# Patient Record
Sex: MALE | Race: WHITE | NOT HISPANIC OR LATINO
[De-identification: names, ages, dates, MRNs, and addresses within clinical notes are randomized per-mention and may not be internally consistent; named-entity substitution may affect disease eponyms.]

---

## 2018-11-01 ENCOUNTER — RECORD ABSTRACTING (OUTPATIENT)
Age: 83
End: 2018-11-01

## 2018-11-01 DIAGNOSIS — Z87.09 PERSONAL HISTORY OF OTHER DISEASES OF THE RESPIRATORY SYSTEM: ICD-10-CM

## 2018-11-01 DIAGNOSIS — Z78.9 OTHER SPECIFIED HEALTH STATUS: ICD-10-CM

## 2018-11-01 RX ORDER — ASPIRIN 81 MG
81 TABLET, DELAYED RELEASE (ENTERIC COATED) ORAL DAILY
Refills: 0 | Status: ACTIVE | COMMUNITY

## 2018-11-28 ENCOUNTER — APPOINTMENT (OUTPATIENT)
Dept: CARDIOLOGY | Facility: CLINIC | Age: 83
End: 2018-11-28
Payer: MEDICARE

## 2018-11-28 VITALS
WEIGHT: 187 LBS | SYSTOLIC BLOOD PRESSURE: 130 MMHG | DIASTOLIC BLOOD PRESSURE: 60 MMHG | HEART RATE: 72 BPM | BODY MASS INDEX: 24.78 KG/M2 | HEIGHT: 73 IN

## 2018-11-28 DIAGNOSIS — Z82.49 FAMILY HISTORY OF ISCHEMIC HEART DISEASE AND OTHER DISEASES OF THE CIRCULATORY SYSTEM: ICD-10-CM

## 2018-11-28 PROCEDURE — 99214 OFFICE O/P EST MOD 30 MIN: CPT

## 2018-11-28 PROCEDURE — 93000 ELECTROCARDIOGRAM COMPLETE: CPT

## 2018-11-28 RX ORDER — LEVOFLOXACIN 500 MG/1
500 TABLET, FILM COATED ORAL DAILY
Refills: 0 | Status: DISCONTINUED
End: 2018-11-28

## 2018-11-28 RX ORDER — OSELTAMIVIR PHOSPHATE 75 MG/1
75 CAPSULE ORAL TWICE DAILY
Refills: 0 | Status: DISCONTINUED | COMMUNITY
End: 2018-11-28

## 2018-11-28 RX ORDER — DOXAZOSIN 2 MG/1
2 TABLET ORAL DAILY
Refills: 0 | Status: DISCONTINUED | COMMUNITY
End: 2018-11-28

## 2018-11-28 RX ORDER — ROSUVASTATIN CALCIUM 5 MG/1
5 TABLET, FILM COATED ORAL DAILY
Refills: 0 | Status: DISCONTINUED | COMMUNITY
End: 2018-06-28

## 2018-11-28 RX ORDER — LEVOFLOXACIN 500 MG/1
500 TABLET, FILM COATED ORAL DAILY
Refills: 0 | Status: DISCONTINUED | COMMUNITY
End: 2018-11-28

## 2018-11-28 NOTE — ASSESSMENT
[FreeTextEntry1] : This report was generated using Dragon Dictation. Please excuse obvious typographical errors and contact this office for any questions. Any preliminary copy of this note given to the patient at the time of this visit has not been proofread or edited. This note is a part of a shared electronic record used by our physicians and may contain information generated by other physicians in this practice in addition to your visit with this office.\par

## 2018-11-28 NOTE — REASON FOR VISIT
[FreeTextEntry1] : This 83-year-old male patient presents for evaluation of coronary atherosclerosis based on the coronary calcification scan, cardiac arrhythmias, abnormal EKG, minor valve abnormalities, cardiovascular risk factors.\par \par He has additional medical problems as noted.\par \par

## 2018-11-28 NOTE — HISTORY OF PRESENT ILLNESS
[FreeTextEntry1] : Since his last examination in the office one year ago the patient has had some medical interactions.\par \par The patient has had some orthopedic issues involving his left knee. He has had injection therapy. He is exercising. He feels it is getting a bit better.\par \par There have been no acute symptoms of shortness of breath, chest discomfort, palpitation, lightheadedness. Overall he feels well.

## 2018-11-28 NOTE — REVIEW OF SYSTEMS
[see HPI] : see HPI [Negative] : Psychiatric [Joint Pain] : joint pain [Cough] : no cough [Wheezing] : no wheezing [Muscle Cramps] : no muscle cramps [Skin: A Rash] : no rash: [Easy Bleeding] : no tendency for easy bleeding [Easy Bruising] : no tendency for easy bruising

## 2019-05-16 ENCOUNTER — APPOINTMENT (OUTPATIENT)
Dept: FAMILY MEDICINE | Facility: CLINIC | Age: 84
End: 2019-05-16
Payer: MEDICARE

## 2019-05-16 VITALS — OXYGEN SATURATION: 98 % | HEART RATE: 58 BPM

## 2019-05-16 VITALS
HEIGHT: 73 IN | DIASTOLIC BLOOD PRESSURE: 70 MMHG | SYSTOLIC BLOOD PRESSURE: 130 MMHG | WEIGHT: 186 LBS | BODY MASS INDEX: 24.65 KG/M2

## 2019-05-16 PROCEDURE — 36415 COLL VENOUS BLD VENIPUNCTURE: CPT

## 2019-05-16 PROCEDURE — 99214 OFFICE O/P EST MOD 30 MIN: CPT | Mod: 25

## 2019-05-16 NOTE — PLAN
[FreeTextEntry1] : Labs sent to [facility]\par Overall is doing well\par F/u cardio and ortho\par Office F/U in 6 mo

## 2019-05-16 NOTE — HEALTH RISK ASSESSMENT
[Good] : ~his/her~ current health as good [No falls in past year] : Patient reported no falls in the past year [] : No [de-identified] : onco, ortho [0] : 1) Little interest or pleasure doing things: Not at all (0) [de-identified] : socially [de-identified] : gym [[id number]] : o [de-identified] : low carbs [Patient reported colonoscopy was normal] : Patient reported colonoscopy was normal [HIV test declined] : HIV test declined [With Family] : lives with family [Hepatitis C test declined] : Hepatitis C test declined [ColonoscopyDate] : 2018

## 2019-05-16 NOTE — PHYSICAL EXAM
[No Acute Distress] : no acute distress [Well Nourished] : well nourished [Well Developed] : well developed [PERRL] : pupils equal round and reactive to light [Normal Sclera/Conjunctiva] : normal sclera/conjunctiva [EOMI] : extraocular movements intact [Normal Outer Ear/Nose] : the outer ears and nose were normal in appearance [No JVD] : no jugular venous distention [Normal TMs] : both tympanic membranes were normal [Normal Oropharynx] : the oropharynx was normal [Supple] : supple [No Lymphadenopathy] : no lymphadenopathy [No Respiratory Distress] : no respiratory distress  [Normal Rate] : normal rate  [Clear to Auscultation] : lungs were clear to auscultation bilaterally [Regular Rhythm] : with a regular rhythm [Normal S1, S2] : normal S1 and S2 [No HSM] : no HSM [Soft] : abdomen soft [No Joint Swelling] : no joint swelling

## 2019-05-16 NOTE — HISTORY OF PRESENT ILLNESS
[FreeTextEntry1] : f/u chronic medical problems\par Known with CAD, high cholesterol, DM\par knee pain [de-identified] : Compliant with medication and medical f/u\par Was seen by dr Rodriguez for knee pain, reports no improvement with steroid intraarticular injection

## 2019-08-12 ENCOUNTER — APPOINTMENT (OUTPATIENT)
Dept: FAMILY MEDICINE | Facility: CLINIC | Age: 84
End: 2019-08-12

## 2019-08-19 ENCOUNTER — APPOINTMENT (OUTPATIENT)
Dept: FAMILY MEDICINE | Facility: CLINIC | Age: 84
End: 2019-08-19
Payer: MEDICARE

## 2019-08-19 VITALS
HEART RATE: 60 BPM | DIASTOLIC BLOOD PRESSURE: 78 MMHG | SYSTOLIC BLOOD PRESSURE: 128 MMHG | BODY MASS INDEX: 24.52 KG/M2 | HEIGHT: 73 IN | RESPIRATION RATE: 15 BRPM | WEIGHT: 185 LBS

## 2019-08-19 PROCEDURE — 99214 OFFICE O/P EST MOD 30 MIN: CPT | Mod: 25

## 2019-08-19 PROCEDURE — 99495 TRANSJ CARE MGMT MOD F2F 14D: CPT

## 2019-08-19 NOTE — PLAN
[FreeTextEntry1] : Doing well now completed 10 days oral antibiotic post discharge\par Need F/U with Urology, will see the urologist in 3 days

## 2019-08-19 NOTE — HISTORY OF PRESENT ILLNESS
[Post-hospitalization from ___ Hospital] : Post-hospitalization from [unfilled] Hospital [Discharged on ___] : discharged on [unfilled] [Discharge Summary] : discharge summary [Pertinent Labs] : pertinent labs [Discharge Med List] : discharge medication list [Med Reconciliation] : medication reconciliation has been completed [FreeTextEntry2] : Admitted with fever and chills as sepsis\par Tis is the 2nd episode\par On abx x 12 days post discharge\par Tested UA +

## 2019-08-19 NOTE — HEALTH RISK ASSESSMENT
[Intercurrent hospitalizations] : was admitted to the hospital  [Yes] : Yes [2 - 4 times a month (2 pts)] : 2-4 times a month (2 points) [1 or 2 (0 pts)] : 1 or 2 (0 points) [Never (0 pts)] : Never (0 points) [No falls in past year] : Patient reported no falls in the past year [0] : 2) Feeling down, depressed, or hopeless: Not at all (0) [Patient reported colonoscopy was normal] : Patient reported colonoscopy was normal [HIV test declined] : HIV test declined [Hepatitis C test declined] : Hepatitis C test declined [None] : None [With Patient/Caregiver] : With Patient/Caregiver [Designated Healthcare Proxy] : Designated healthcare proxy [Name: ___] : Health Care Proxy's Name: [unfilled]  [I will adhere to the patient's wishes as expressed in the advance directive except as noted below.] : I will adhere to the patient's wishes as expressed in the advance directive except as noted below [] : No [Audit-CScore] : 2 [de-identified] : low carb [[id number]] : 1 [Behavior] : denies difficulty with behavior [Learning/Retaining New Information] : denies difficulty learning/retaining new information [ColonoscopyDate] : 2017 [AdvancecareDate] : 8/19

## 2019-10-10 ENCOUNTER — APPOINTMENT (OUTPATIENT)
Dept: FAMILY MEDICINE | Facility: CLINIC | Age: 84
End: 2019-10-10
Payer: MEDICARE

## 2019-10-10 VITALS
OXYGEN SATURATION: 98 % | HEART RATE: 64 BPM | HEIGHT: 73 IN | SYSTOLIC BLOOD PRESSURE: 124 MMHG | DIASTOLIC BLOOD PRESSURE: 72 MMHG | RESPIRATION RATE: 16 BRPM | WEIGHT: 185 LBS | BODY MASS INDEX: 24.52 KG/M2

## 2019-10-10 PROCEDURE — 90662 IIV NO PRSV INCREASED AG IM: CPT

## 2019-10-10 PROCEDURE — G0009: CPT

## 2019-10-10 PROCEDURE — G0008: CPT

## 2019-10-10 PROCEDURE — 90670 PCV13 VACCINE IM: CPT

## 2019-11-13 DIAGNOSIS — Z01.89 ENCOUNTER FOR OTHER SPECIFIED SPECIAL EXAMINATIONS: ICD-10-CM

## 2019-11-14 ENCOUNTER — NON-APPOINTMENT (OUTPATIENT)
Age: 84
End: 2019-11-14

## 2019-11-14 ENCOUNTER — APPOINTMENT (OUTPATIENT)
Dept: CARDIOLOGY | Facility: CLINIC | Age: 84
End: 2019-11-14
Payer: MEDICARE

## 2019-11-14 VITALS
DIASTOLIC BLOOD PRESSURE: 68 MMHG | HEIGHT: 73 IN | BODY MASS INDEX: 24.39 KG/M2 | WEIGHT: 184 LBS | HEART RATE: 56 BPM | SYSTOLIC BLOOD PRESSURE: 130 MMHG

## 2019-11-14 PROCEDURE — 93000 ELECTROCARDIOGRAM COMPLETE: CPT

## 2019-11-14 PROCEDURE — 99214 OFFICE O/P EST MOD 30 MIN: CPT

## 2019-11-14 NOTE — DISCUSSION/SUMMARY
[FreeTextEntry1] : Brief recommendations and follow-up: (see above for details)\par \par His overall cardiovascular status is stable.\par Echocardiogram will be ordered to assess cardiac murmur and valve findings.\par I will take the liberty of ordering comprehensive labs during his visit for both my review and his primary care physician.\par Next routine cardiac visit 1 year

## 2019-11-14 NOTE — HISTORY OF PRESENT ILLNESS
[FreeTextEntry1] : This 84 year-old male patient presents for cardiovascular evaluation.\par \par His problem list is as noted above.\par \par Since his last examination 1 year ago he has had some medical interactions.  He was hospitalized at [facility] for a urinary tract infection.  He reports he was hospitalized for 4 days.  He has had follow-up with his primary care physician.  He has had laboratories that are reviewed below.\par \par There were no cardiac complications.  He tries to stay active.  He does have some orthopedic limitations.  He notes some occasional lightheadedness but no syncope.  No chest discomfort or shortness of breath.

## 2019-11-14 NOTE — REASON FOR VISIT
[FreeTextEntry1] : Mr. ALEXIA GARDNER has the following problem list:\par \par Coronary atherosclerosis on the basis of imaging.\par Cardiac arrhythmias, \par Abnormal EKG, \par Minor valve abnormalities,\par Dyslipidemia\par Hyperglycemia\par \par He has additional medical problems as noted.\par \par His primary care physician is Dr. Rahel Kamara.\par \par

## 2019-11-14 NOTE — ASSESSMENT
[FreeTextEntry1] : EKG 11/14/2019.  Sinus bradycardia.  Heart rate 56.  Right bundle branch block.  Left anterior fascicular block.  No acute changes.

## 2019-11-20 ENCOUNTER — APPOINTMENT (OUTPATIENT)
Dept: FAMILY MEDICINE | Facility: CLINIC | Age: 84
End: 2019-11-20
Payer: MEDICARE

## 2019-11-20 VITALS
HEIGHT: 61 IN | HEART RATE: 58 BPM | OXYGEN SATURATION: 98 % | WEIGHT: 184 LBS | DIASTOLIC BLOOD PRESSURE: 74 MMHG | RESPIRATION RATE: 16 BRPM | BODY MASS INDEX: 34.74 KG/M2 | SYSTOLIC BLOOD PRESSURE: 122 MMHG

## 2019-11-20 DIAGNOSIS — R79.89 OTHER SPECIFIED ABNORMAL FINDINGS OF BLOOD CHEMISTRY: ICD-10-CM

## 2019-11-20 PROCEDURE — 36415 COLL VENOUS BLD VENIPUNCTURE: CPT

## 2019-11-20 PROCEDURE — 99214 OFFICE O/P EST MOD 30 MIN: CPT | Mod: 25

## 2019-11-20 NOTE — PLAN
[FreeTextEntry1] : Chronic medical condition in stable condition\par Has no complaints\par will contact the patient with blood test results

## 2019-11-20 NOTE — HEALTH RISK ASSESSMENT
[Monthly or less (1 pt)] : Monthly or less (1 point) [1 or 2 (0 pts)] : 1 or 2 (0 points) [No falls in past year] : Patient reported no falls in the past year [0] : 1) Little interest or pleasure doing things: Not at all (0) [] : No [[id number]] : 0

## 2019-11-20 NOTE — HISTORY OF PRESENT ILLNESS
[FreeTextEntry1] : F/u chronic medical problems [de-identified] : Compliant with medication and medical f/u\par Recent cardiology note reviewed\par WaFor knee pain seen by dr Rodriguez for knee pain, reports no improvement with steroid intraarticular injection

## 2019-11-21 LAB
25(OH)D3 SERPL-MCNC: 42.7 NG/ML
ALBUMIN SERPL ELPH-MCNC: 4.5 G/DL
ALP BLD-CCNC: 129 U/L
ALT SERPL-CCNC: 17 U/L
ANION GAP SERPL CALC-SCNC: 18 MMOL/L
AST SERPL-CCNC: 20 U/L
BASOPHILS # BLD AUTO: 0.03 K/UL
BASOPHILS NFR BLD AUTO: 0.6 %
BILIRUB SERPL-MCNC: 0.8 MG/DL
BUN SERPL-MCNC: 29 MG/DL
CALCIUM SERPL-MCNC: 10.3 MG/DL
CEA SERPL-MCNC: 2.7 NG/ML
CHLORIDE SERPL-SCNC: 101 MMOL/L
CHOLEST SERPL-MCNC: 177 MG/DL
CHOLEST/HDLC SERPL: 4.3 RATIO
CO2 SERPL-SCNC: 20 MMOL/L
CREAT SERPL-MCNC: 1.39 MG/DL
CRP SERPL HS-MCNC: 0.42 MG/L
EOSINOPHIL # BLD AUTO: 0.27 K/UL
EOSINOPHIL NFR BLD AUTO: 5.1 %
ESTIMATED AVERAGE GLUCOSE: 120 MG/DL
FERRITIN SERPL-MCNC: 599 NG/ML
GLUCOSE SERPL-MCNC: 138 MG/DL
HBA1C MFR BLD HPLC: 5.8 %
HCT VFR BLD CALC: 47.9 %
HDLC SERPL-MCNC: 41 MG/DL
HGB BLD-MCNC: 15.3 G/DL
IMM GRANULOCYTES NFR BLD AUTO: 0.4 %
IRON SATN MFR SERPL: 34 %
IRON SERPL-MCNC: 127 UG/DL
LDLC SERPL CALC-MCNC: 115 MG/DL
LYMPHOCYTES # BLD AUTO: 2.06 K/UL
LYMPHOCYTES NFR BLD AUTO: 38.9 %
MAN DIFF?: NORMAL
MCHC RBC-ENTMCNC: 31.9 GM/DL
MCHC RBC-ENTMCNC: 33.4 PG
MCV RBC AUTO: 104.6 FL
MONOCYTES # BLD AUTO: 0.51 K/UL
MONOCYTES NFR BLD AUTO: 9.6 %
NEUTROPHILS # BLD AUTO: 2.4 K/UL
NEUTROPHILS NFR BLD AUTO: 45.4 %
PLATELET # BLD AUTO: 133 K/UL
POTASSIUM SERPL-SCNC: 5.5 MMOL/L
PROT SERPL-MCNC: 7.7 G/DL
PSA SERPL-MCNC: 3.06 NG/ML
RBC # BLD: 4.58 M/UL
RBC # FLD: 11.2 %
SODIUM SERPL-SCNC: 139 MMOL/L
TIBC SERPL-MCNC: 376 UG/DL
TRIGL SERPL-MCNC: 106 MG/DL
TSH SERPL-ACNC: 3.7 UIU/ML
UIBC SERPL-MCNC: 249 UG/DL
VIT B12 SERPL-MCNC: 863 PG/ML
WBC # FLD AUTO: 5.29 K/UL

## 2019-12-03 ENCOUNTER — RESULT REVIEW (OUTPATIENT)
Age: 84
End: 2019-12-03

## 2019-12-09 ENCOUNTER — RESULT REVIEW (OUTPATIENT)
Age: 84
End: 2019-12-09

## 2019-12-11 ENCOUNTER — RESULT REVIEW (OUTPATIENT)
Age: 84
End: 2019-12-11

## 2020-06-08 ENCOUNTER — APPOINTMENT (OUTPATIENT)
Dept: FAMILY MEDICINE | Facility: CLINIC | Age: 85
End: 2020-06-08
Payer: MEDICARE

## 2020-06-08 VITALS
BODY MASS INDEX: 25.62 KG/M2 | OXYGEN SATURATION: 98 % | HEART RATE: 59 BPM | DIASTOLIC BLOOD PRESSURE: 65 MMHG | RESPIRATION RATE: 15 BRPM | HEIGHT: 71 IN | WEIGHT: 183 LBS | SYSTOLIC BLOOD PRESSURE: 128 MMHG

## 2020-06-08 PROCEDURE — 36415 COLL VENOUS BLD VENIPUNCTURE: CPT | Mod: CS

## 2020-06-08 PROCEDURE — G0439: CPT | Mod: CS

## 2020-06-08 PROCEDURE — 99215 OFFICE O/P EST HI 40 MIN: CPT | Mod: CS,25

## 2020-06-08 PROCEDURE — G0442 ANNUAL ALCOHOL SCREEN 15 MIN: CPT | Mod: CS,59

## 2020-06-08 NOTE — HISTORY OF PRESENT ILLNESS
[FreeTextEntry1] : f/u chronic medical problems\par Known with CAD, high cholesterol, DM\par Concerned of \par knee pain [de-identified] : Compliant with medication and medical f/u\par Octagonally report dizziness with sudden changes in body position\par still playing tennis

## 2020-06-08 NOTE — HEALTH RISK ASSESSMENT
[Good] : ~his/her~  mood as  good [Yes] : Yes [2 - 4 times a month (2 pts)] : 2-4 times a month (2 points) [1 or 2 (0 pts)] : 1 or 2 (0 points) [No falls in past year] : Patient reported no falls in the past year [1] : 1) Little interest or pleasure doing things for several days (1) [0] : 2) Feeling down, depressed, or hopeless: Not at all (0) [Patient reported colonoscopy was normal] : Patient reported colonoscopy was normal [HIV test declined] : HIV test declined [Hepatitis C test declined] : Hepatitis C test declined [Handling Complex Tasks] : difficulty handling complex tasks [None] : None [With Family] : lives with family [Retired] : retired [Graduate School] : graduate school [] : No [Audit-CScore] : 2 [[id number]] : 1 [Change in mental status noted] : No change in mental status noted [Language] : denies difficulty with language [Behavior] : denies difficulty with behavior [Learning/Retaining New Information] : denies difficulty learning/retaining new information [ColonoscopyComments] : UTD

## 2020-07-13 ENCOUNTER — APPOINTMENT (OUTPATIENT)
Dept: FAMILY MEDICINE | Facility: CLINIC | Age: 85
End: 2020-07-13
Payer: MEDICARE

## 2020-07-13 DIAGNOSIS — Z85.038 PERSONAL HISTORY OF OTHER MALIGNANT NEOPLASM OF LARGE INTESTINE: ICD-10-CM

## 2020-07-13 PROCEDURE — 36415 COLL VENOUS BLD VENIPUNCTURE: CPT | Mod: CS

## 2020-07-13 PROCEDURE — 99213 OFFICE O/P EST LOW 20 MIN: CPT | Mod: CS,25

## 2020-07-13 NOTE — PLAN
[FreeTextEntry1] : Blood for covid-19 Ab and CEA sent to [facility] at patient's request\par Educated about Covid-19 infection

## 2020-07-13 NOTE — HISTORY OF PRESENT ILLNESS
[FreeTextEntry8] : Patient is here for COVID-19 antibody testing and bloodwork\par Has a history of Colon cancer

## 2020-10-13 ENCOUNTER — APPOINTMENT (OUTPATIENT)
Dept: FAMILY MEDICINE | Facility: CLINIC | Age: 85
End: 2020-10-13
Payer: MEDICARE

## 2020-10-13 PROCEDURE — G0009: CPT

## 2020-10-13 PROCEDURE — 90662 IIV NO PRSV INCREASED AG IM: CPT

## 2020-10-13 PROCEDURE — G0008: CPT

## 2020-10-13 PROCEDURE — 90732 PPSV23 VACC 2 YRS+ SUBQ/IM: CPT

## 2020-11-03 ENCOUNTER — NON-APPOINTMENT (OUTPATIENT)
Age: 85
End: 2020-11-03

## 2020-11-04 ENCOUNTER — NON-APPOINTMENT (OUTPATIENT)
Age: 85
End: 2020-11-04

## 2020-11-04 ENCOUNTER — APPOINTMENT (OUTPATIENT)
Dept: CARDIOLOGY | Facility: CLINIC | Age: 85
End: 2020-11-04
Payer: MEDICARE

## 2020-11-04 VITALS
WEIGHT: 185 LBS | SYSTOLIC BLOOD PRESSURE: 110 MMHG | DIASTOLIC BLOOD PRESSURE: 60 MMHG | HEART RATE: 58 BPM | HEIGHT: 71 IN | BODY MASS INDEX: 25.9 KG/M2

## 2020-11-04 DIAGNOSIS — Z86.19 PERSONAL HISTORY OF OTHER INFECTIOUS AND PARASITIC DISEASES: ICD-10-CM

## 2020-11-04 DIAGNOSIS — Z23 ENCOUNTER FOR IMMUNIZATION: ICD-10-CM

## 2020-11-04 DIAGNOSIS — Z71.89 OTHER SPECIFIED COUNSELING: ICD-10-CM

## 2020-11-04 PROCEDURE — 93000 ELECTROCARDIOGRAM COMPLETE: CPT

## 2020-11-04 PROCEDURE — 99214 OFFICE O/P EST MOD 30 MIN: CPT

## 2020-11-04 RX ORDER — MULTIVIT-MIN/FOLIC/VIT K/LYCOP 400-300MCG
25 MCG TABLET ORAL DAILY
Refills: 0 | Status: ACTIVE | COMMUNITY

## 2020-11-04 RX ORDER — CYANOCOBALAMIN (VITAMIN B-12) 3000MCG/ML
DROPS SUBLINGUAL DAILY
Refills: 0 | Status: DISCONTINUED | COMMUNITY
End: 2020-11-04

## 2020-11-04 RX ORDER — MULTIVIT-MIN/FOLIC/VIT K/LYCOP 400-300MCG
250 TABLET ORAL DAILY
Refills: 0 | Status: ACTIVE | COMMUNITY

## 2020-11-04 RX ORDER — DIETHYLTOLUAMIDE 7 %
600-40-500 SPRAY, NON-AEROSOL (ML) TOPICAL
Refills: 0 | Status: ACTIVE | COMMUNITY

## 2020-11-04 NOTE — REASON FOR VISIT
[FreeTextEntry1] : Mr. ALEXIA GARDNER has the following problem list:\par \par Coronary atherosclerosis on the basis of imaging.\par Cardiac arrhythmias, \par Abnormal EKG, RBBB, LAFB.\par Minor valve abnormalities,\par Dyslipidemia\par Diabetes.\par Chronic kidney disease.\par \par He has additional medical problems as noted.\par \par His primary care physician is Dr. Rahel Kamara.\par \par

## 2020-11-04 NOTE — HISTORY OF PRESENT ILLNESS
[FreeTextEntry1] : This 85 year-old male patient presents for cardiovascular evaluation.\par \par His problem list is as noted above.\par \par Since her last examination 1 year ago he reports no major events or hospitalizations.\par \par He is active and plays tennis regularly.  There are no exertional symptoms.\par \par He has had follow-up with his primary care physician.  He has had laboratories as noted below.\par

## 2020-11-04 NOTE — ASSESSMENT
[FreeTextEntry1] : EKG 11/4/2020.  Sinus bradycardia.  Heart rate 58.  First-degree AV block.  SD interval 0.23.  RBBB.  LAFB.\par EKG 11/14/2019.  Sinus bradycardia.  Heart rate 56.  Right bundle branch block.  Left anterior fascicular block.  No acute changes.

## 2020-11-04 NOTE — DISCUSSION/SUMMARY
[FreeTextEntry1] : Brief recommendations and follow-up: (see above for details)\par \par The patient's overall cardiovascular status appears well compensated.\par He will continue to work on risk factor reduction particularly his lipids and his sugar levels with therapeutic lifestyle treatment.\par His valve status is stable.\par He does have first-degree AV block, right bundle branch block, left anterior fascicular block, overall stable.\par He has had a rise in his creatinine and is noted I have taken the liberty of recommending formal nephrology consultation.\par Next routine cardiology visit 1 year.\par

## 2020-11-19 ENCOUNTER — APPOINTMENT (OUTPATIENT)
Dept: CARDIOLOGY | Facility: CLINIC | Age: 85
End: 2020-11-19

## 2021-04-12 NOTE — PHYSICAL EXAM
[FreeTextEntry1] : Please note the patient was reviewed with the NP.\par I was physically present during the service of the patient\maureen I was directly involved in the management plan and recommendations of care provided to the patient. \par I personally reviewed the history and physical exam and plan as documented by the NP above.\par \par Jake Nieves DO, FACC, RPVI\par Cardiologist\par 04/12/2021 [General Appearance - Well Developed] : well developed [Normal Appearance] : normal appearance [Well Groomed] : well groomed [General Appearance - Well Nourished] : well nourished [No Deformities] : no deformities [General Appearance - In No Acute Distress] : no acute distress [Normal Conjunctiva] : the conjunctiva exhibited no abnormalities [Eyelids - No Xanthelasma] : the eyelids demonstrated no xanthelasmas [Normal Oral Mucosa] : normal oral mucosa [No Oral Pallor] : no oral pallor [No Oral Cyanosis] : no oral cyanosis [Normal Jugular Venous A Waves Present] : normal jugular venous A waves present [Normal Jugular Venous V Waves Present] : normal jugular venous V waves present [No Jugular Venous Perez A Waves] : no jugular venous perez A waves [Respiration, Rhythm And Depth] : normal respiratory rhythm and effort [Exaggerated Use Of Accessory Muscles For Inspiration] : no accessory muscle use [Auscultation Breath Sounds / Voice Sounds] : lungs were clear to auscultation bilaterally [Heart Sounds] : normal S1 and S2 [Abdomen Soft] : soft [Abdomen Tenderness] : non-tender [Abdomen Mass (___ Cm)] : no abdominal mass palpated [Abnormal Walk] : normal gait [Gait - Sufficient For Exercise Testing] : the gait was sufficient for exercise testing [Nail Clubbing] : no clubbing of the fingernails [Cyanosis, Localized] : no localized cyanosis [Petechial Hemorrhages (___cm)] : no petechial hemorrhages [Skin Color & Pigmentation] : normal skin color and pigmentation [] : no rash [No Venous Stasis] : no venous stasis [Skin Lesions] : no skin lesions [No Skin Ulcers] : no skin ulcer [No Xanthoma] : no  xanthoma was observed [Oriented To Time, Place, And Person] : oriented to person, place, and time [Affect] : the affect was normal [Mood] : the mood was normal [No Anxiety] : not feeling anxious [FreeTextEntry1] : He has a 1/6 systolic murmur at the left sternal border.

## 2021-06-15 ENCOUNTER — APPOINTMENT (OUTPATIENT)
Dept: FAMILY MEDICINE | Facility: CLINIC | Age: 86
End: 2021-06-15
Payer: MEDICARE

## 2021-06-15 VITALS
TEMPERATURE: 98 F | OXYGEN SATURATION: 97 % | HEART RATE: 62 BPM | SYSTOLIC BLOOD PRESSURE: 126 MMHG | RESPIRATION RATE: 16 BRPM | HEIGHT: 71 IN | DIASTOLIC BLOOD PRESSURE: 78 MMHG | BODY MASS INDEX: 26.04 KG/M2 | WEIGHT: 186 LBS

## 2021-06-15 VITALS — DIASTOLIC BLOOD PRESSURE: 52 MMHG | SYSTOLIC BLOOD PRESSURE: 110 MMHG

## 2021-06-15 DIAGNOSIS — M25.562 PAIN IN RIGHT KNEE: ICD-10-CM

## 2021-06-15 DIAGNOSIS — M25.561 PAIN IN RIGHT KNEE: ICD-10-CM

## 2021-06-15 PROCEDURE — G0444 DEPRESSION SCREEN ANNUAL: CPT | Mod: 59

## 2021-06-15 PROCEDURE — 99215 OFFICE O/P EST HI 40 MIN: CPT | Mod: 25

## 2021-06-15 PROCEDURE — G0442 ANNUAL ALCOHOL SCREEN 15 MIN: CPT | Mod: 59

## 2021-06-15 RX ORDER — AZITHROMYCIN 250 MG/1
250 TABLET, FILM COATED ORAL
Qty: 1 | Refills: 0 | Status: COMPLETED | COMMUNITY
Start: 2021-01-07 | End: 2021-06-15

## 2021-06-15 RX ORDER — MENTHOL/CETYLPYRD CL 4.5 MG
5.4 LOZENGE MUCOUS MEMBRANE
Qty: 20 | Refills: 0 | Status: COMPLETED | COMMUNITY
Start: 2021-01-07 | End: 2021-06-15

## 2021-06-15 NOTE — PLAN
[FreeTextEntry1] : Patient appears in good physical and mental health. Patient's knee pain is most likely osteoarthritic, though he is still very functional (plays tennis daily), and recommend he continue topical therapy that is providing relief. Falls are likely orthostatic, patient provided education regarding orthostatic hypotension and recommend slowly rising in stages to prevent lightheadedness and falls.

## 2021-06-15 NOTE — HEALTH RISK ASSESSMENT
[Yes] : Yes [2 - 3 times a week (3 pts)] : 2 - 3  times a week (3 points) [1 or 2 (0 pts)] : 1 or 2 (0 points) [Never (0 pts)] : Never (0 points) [No] : In the past 12 months have you used drugs other than those required for medical reasons? No [Two or more falls in past year] : Patient reported two or more falls in the past year [0] : 2) Feeling down, depressed, or hopeless: Not at all (0) [HIV test declined] : HIV test declined [Hepatitis C test declined] : Hepatitis C test declined [Patient reported colonoscopy was normal] : Patient reported colonoscopy was normal [Change in mental status noted] : Change in mental status noted [None] : None [Retired] : retired [Graduate School] : graduate school [] :  [Feels Safe at Home] : Feels safe at home [Fully functional (bathing, dressing, toileting, transferring, walking, feeding)] : Fully functional (bathing, dressing, toileting, transferring, walking, feeding) [Fully functional (using the telephone, shopping, preparing meals, housekeeping, doing laundry, using] : Fully functional and needs no help or supervision to perform IADLs (using the telephone, shopping, preparing meals, housekeeping, doing laundry, using transportation, managing medications and managing finances) [Reports changes in hearing] : Reports changes in hearing [Reports normal functional visual acuity (ie: able to read med bottle)] : Reports normal functional visual acuity [Smoke Detector] : smoke detector [Carbon Monoxide Detector] : carbon monoxide detector [Sunscreen] : uses sunscreen [Seat Belt] :  uses seat belt [Reviewed no changes] : Reviewed no changes [Good] : ~his/her~  mood as  good [FreeTextEntry1] : Knee pain [] : No [de-identified] : none [de-identified] : Cardiology, Urology [Audit-CScore] : 3 [de-identified] : Tennis everyday 1.5 hours [de-identified] : non-restrictive diet, well balanced [[id number]] : 0 [Language] : denies difficulty with language [Behavior] : denies difficulty with behavior [Learning/Retaining New Information] : denies difficulty learning/retaining new information [Handling Complex Tasks] : denies difficulty handling complex tasks [Reasoning] : denies difficulty with reasoning [Spatial Ability and Orientation] : denies difficulty with spatial ability and orientation [Sexually Active] : not sexually active [High Risk Behavior] : no high risk behavior [Reports changes in vision] : Reports no changes in vision [Reports changes in dental health] : Reports no changes in dental health [Guns at Home] : no guns at home [Travel to Developing Areas] : does not  travel to developing areas [TB Exposure] : is not being exposed to tuberculosis [Caregiver Concerns] : does not have caregiver concerns [ColonoscopyDate] : 01/2019 [ColonoscopyComments] : History of colon cancer dx 2007 [de-identified] : Memory difficulty, especially remembering new names [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

## 2021-06-15 NOTE — HISTORY OF PRESENT ILLNESS
[FreeTextEntry1] : Annual examination [de-identified] : Patient is a 85 year old male with a history of colon cancer, recurrent UTI and arrhythmia who comes to the office for an annual exam. He has been experiencing bilateral knee stiffness and pain that begins for 10-20min in the morning and improves significantly with topical treatment. He had 2 falls in the past year, both of which occurred after standing up and feeling lightheaded. He had no injuries or hospitalizations related to the falls. Patient also provided results for renal ultrasound performed as part of workup for UTI.

## 2021-06-15 NOTE — PHYSICAL EXAM
[Normal Rate] : normal rate  [Normal S1, S2] : normal S1 and S2 [No Murmur] : no murmur heard [Normal] : affect was normal and insight and judgment were intact [de-identified] : irregular rhythm

## 2021-06-15 NOTE — REVIEW OF SYSTEMS
[Joint Pain] : joint pain [Joint Stiffness] : joint stiffness [Negative] : Heme/Lymph [FreeTextEntry9] : morning knee pain and stiffness b/l

## 2021-10-14 ENCOUNTER — APPOINTMENT (OUTPATIENT)
Dept: FAMILY MEDICINE | Facility: CLINIC | Age: 86
End: 2021-10-14
Payer: MEDICARE

## 2021-10-14 VITALS — DIASTOLIC BLOOD PRESSURE: 70 MMHG | SYSTOLIC BLOOD PRESSURE: 115 MMHG

## 2021-10-14 PROCEDURE — G0008: CPT

## 2021-10-14 PROCEDURE — 90662 IIV NO PRSV INCREASED AG IM: CPT

## 2021-10-14 PROCEDURE — 36415 COLL VENOUS BLD VENIPUNCTURE: CPT

## 2021-10-14 PROCEDURE — 99213 OFFICE O/P EST LOW 20 MIN: CPT | Mod: 25

## 2021-10-14 NOTE — HISTORY OF PRESENT ILLNESS
[FreeTextEntry1] : Follow UO\par Flu vaccine [de-identified] : Received Covid vaccine\par Wants to know his Ab Levels\par Also Has body ache

## 2021-10-16 ENCOUNTER — NON-APPOINTMENT (OUTPATIENT)
Age: 86
End: 2021-10-16

## 2021-10-17 LAB
COVID-19 SPIKE DOMAIN ANTIBODY INTERPRETATION: POSITIVE
CRP SERPL-MCNC: <3 MG/L
ERYTHROCYTE [SEDIMENTATION RATE] IN BLOOD BY WESTERGREN METHOD: 17 MM/HR
ESTIMATED AVERAGE GLUCOSE: 123 MG/DL
HBA1C MFR BLD HPLC: 5.9 %
SARS-COV-2 AB SERPL IA-ACNC: 174 U/ML

## 2021-11-03 ENCOUNTER — NON-APPOINTMENT (OUTPATIENT)
Age: 86
End: 2021-11-03

## 2021-11-08 ENCOUNTER — NON-APPOINTMENT (OUTPATIENT)
Age: 86
End: 2021-11-08

## 2021-11-08 ENCOUNTER — APPOINTMENT (OUTPATIENT)
Dept: CARDIOLOGY | Facility: CLINIC | Age: 86
End: 2021-11-08
Payer: MEDICARE

## 2021-11-08 VITALS
HEIGHT: 71 IN | WEIGHT: 188 LBS | DIASTOLIC BLOOD PRESSURE: 80 MMHG | OXYGEN SATURATION: 95 % | SYSTOLIC BLOOD PRESSURE: 110 MMHG | BODY MASS INDEX: 26.32 KG/M2 | HEART RATE: 64 BPM | TEMPERATURE: 98.6 F

## 2021-11-08 DIAGNOSIS — Z20.822 CONTACT WITH AND (SUSPECTED) EXPOSURE TO COVID-19: ICD-10-CM

## 2021-11-08 DIAGNOSIS — N39.0 URINARY TRACT INFECTION, SITE NOT SPECIFIED: ICD-10-CM

## 2021-11-08 DIAGNOSIS — Z87.09 PERSONAL HISTORY OF OTHER DISEASES OF THE RESPIRATORY SYSTEM: ICD-10-CM

## 2021-11-08 PROCEDURE — 99214 OFFICE O/P EST MOD 30 MIN: CPT

## 2021-11-08 PROCEDURE — 93000 ELECTROCARDIOGRAM COMPLETE: CPT

## 2021-11-08 NOTE — HISTORY OF PRESENT ILLNESS
[FreeTextEntry1] : This 86 year-old male patient presents for cardiovascular evaluation.\par \par His problem list is as noted above.\par \par Since his last examination 1 year ago he reports no major events or hospitalizations.  He is active and plays tennis nearly every day.  There are no exertional symptoms of shortness of breath, chest discomfort, palpitation, lightheadedness, fainting.\par

## 2021-11-08 NOTE — REVIEW OF SYSTEMS
[Negative] : Heme/Lymph [FreeTextEntry3] : He reports early cataracts. [FreeTextEntry4] : Mild hearing loss. [FreeTextEntry5] : See HPI. [FreeTextEntry7] : Longstanding treated constipation. [FreeTextEntry8] : Nocturia x1.  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  Not active. [FreeTextEntry9] : Moderate knee pain.

## 2021-11-08 NOTE — ASSESSMENT
[FreeTextEntry1] : EKG 11/8/2021.  Sinus rhythm.  APC.  VPC versus aberrant conduction.  RBBB.  LAFB.  First-degree AV block.  No acute features.\par EKG 11/4/2020.  Sinus bradycardia.  Heart rate 58.  First-degree AV block.  SC interval 0.23.  RBBB.  LAFB.\par EKG 11/14/2019.  Sinus bradycardia.  Heart rate 56.  Right bundle branch block.  Left anterior fascicular block.  No acute changes.

## 2021-11-08 NOTE — CARDIOLOGY SUMMARY
[de-identified] : EKG 11/8/2021.  Sinus rhythm.  APC.  VPC versus aberrant conduction.  RBBB.  LAFB.  First-degree AV block.  No acute features. [de-identified] : Holter 01/2012 sinus rhythm. Very frequent APCs. Frequent VPCs. Occasional but  inconsistent symptom correlation.\par \par  [de-identified] : Treadmill Sestamibi 01/2012. Normal. 9 minutes. Cedric stage III. 54%.\par  [de-identified] : Echocardiogram 12/5/2019. A. F. Normal LV function. EF 70%. Mild diastolic dysfunction.  Slightly dilated LA. Normal PA. Mild MR. Aortic sclerosis without gradient. No change  from 2016.\par Echo 1/14/2016. Normal LV function. EF 70%. Trace MR. Trivial aortic sclerosis. Gradient  10/5. Trace TR. PA 23-28.\par

## 2021-11-08 NOTE — DISCUSSION/SUMMARY
[FreeTextEntry1] : Brief recommendations and follow-up: (see above for details)\par \par Patient's overall cardiovascular system is well compensated.\par Stable EKG and arrhythmia findings.\par I will order a follow-up echocardiogram for his murmur/valve findings.\par Continue risk factor reduction.  Next routine cardiology visit 1 year.

## 2021-11-08 NOTE — PHYSICAL EXAM
[Well Developed] : well developed [Well Nourished] : well nourished [No Acute Distress] : no acute distress [Normal Conjunctiva] : normal conjunctiva [Normal Venous Pressure] : normal venous pressure [No Carotid Bruit] : no carotid bruit [Normal S1, S2] : normal S1, S2 [No Rub] : no rub [No Gallop] : no gallop [Clear Lung Fields] : clear lung fields [Good Air Entry] : good air entry [No Respiratory Distress] : no respiratory distress  [Soft] : abdomen soft [Non Tender] : non-tender [No Masses/organomegaly] : no masses/organomegaly [Normal Bowel Sounds] : normal bowel sounds [Normal Gait] : normal gait [No Edema] : no edema [No Cyanosis] : no cyanosis [No Clubbing] : no clubbing [No Varicosities] : no varicosities [No Rash] : no rash [No Skin Lesions] : no skin lesions [Moves all extremities] : moves all extremities [No Focal Deficits] : no focal deficits [Normal Speech] : normal speech [Alert and Oriented] : alert and oriented [Normal memory] : normal memory [de-identified] : 1–2/6 systolic murmur left sternal border.

## 2022-06-15 ENCOUNTER — APPOINTMENT (OUTPATIENT)
Dept: FAMILY MEDICINE | Facility: CLINIC | Age: 87
End: 2022-06-15
Payer: MEDICARE

## 2022-06-15 VITALS
DIASTOLIC BLOOD PRESSURE: 76 MMHG | RESPIRATION RATE: 16 BRPM | HEIGHT: 71 IN | SYSTOLIC BLOOD PRESSURE: 112 MMHG | WEIGHT: 185 LBS | HEART RATE: 62 BPM | OXYGEN SATURATION: 96 % | BODY MASS INDEX: 25.9 KG/M2 | TEMPERATURE: 98 F

## 2022-06-15 DIAGNOSIS — R41.3 OTHER AMNESIA: ICD-10-CM

## 2022-06-15 DIAGNOSIS — Z00.00 ENCOUNTER FOR GENERAL ADULT MEDICAL EXAMINATION W/OUT ABNORMAL FINDINGS: ICD-10-CM

## 2022-06-15 DIAGNOSIS — R52 PAIN, UNSPECIFIED: ICD-10-CM

## 2022-06-15 DIAGNOSIS — G62.9 POLYNEUROPATHY, UNSPECIFIED: ICD-10-CM

## 2022-06-15 PROCEDURE — G0444 DEPRESSION SCREEN ANNUAL: CPT | Mod: 59

## 2022-06-15 PROCEDURE — G0439: CPT

## 2022-06-15 PROCEDURE — 36415 COLL VENOUS BLD VENIPUNCTURE: CPT

## 2022-06-15 PROCEDURE — G0442 ANNUAL ALCOHOL SCREEN 15 MIN: CPT | Mod: 59

## 2022-06-15 NOTE — HISTORY OF PRESENT ILLNESS
[FreeTextEntry1] : \par Annual examination\par F/U chronic problems\par memory issues\par Numbness in legs\par Concern of PDA [de-identified] : overall is doing wellReceived Covid vaccine\par Wants to know his Ab Levels\par Also Has body ache

## 2022-06-15 NOTE — PLAN
[FreeTextEntry1] : Labs sent to [facility] at patient's request\par Will contact with results\par Started on Duloxetine and Prevagen\par Get arterial doppler

## 2022-06-15 NOTE — PHYSICAL EXAM
[Normal] : no joint swelling and grossly normal strength and tone [de-identified] : periph neuropathy

## 2022-06-15 NOTE — HEALTH RISK ASSESSMENT
[Good] : ~his/her~ current health as good [Fair] :  ~his/her~ mood as fair [Yes] : Yes [Monthly or less (1 pt)] : Monthly or less (1 point) [No falls in past year] : Patient reported no falls in the past year [1] : 2) Feeling down, depressed, or hopeless for several days (1) [Audit-CScore] : 1 [[id number]] : 2

## 2022-07-06 ENCOUNTER — APPOINTMENT (OUTPATIENT)
Dept: FAMILY MEDICINE | Facility: CLINIC | Age: 87
End: 2022-07-06

## 2022-07-06 ENCOUNTER — RESULT REVIEW (OUTPATIENT)
Age: 87
End: 2022-07-06

## 2022-07-06 VITALS
RESPIRATION RATE: 16 BRPM | OXYGEN SATURATION: 96 % | DIASTOLIC BLOOD PRESSURE: 68 MMHG | HEART RATE: 61 BPM | HEIGHT: 71 IN | BODY MASS INDEX: 26.6 KG/M2 | WEIGHT: 190 LBS | SYSTOLIC BLOOD PRESSURE: 134 MMHG | TEMPERATURE: 98 F

## 2022-07-06 DIAGNOSIS — R30.0 DYSURIA: ICD-10-CM

## 2022-07-06 DIAGNOSIS — R53.81 OTHER MALAISE: ICD-10-CM

## 2022-07-06 DIAGNOSIS — R50.9 FEVER, UNSPECIFIED: ICD-10-CM

## 2022-07-06 PROCEDURE — 81003 URINALYSIS AUTO W/O SCOPE: CPT | Mod: QW

## 2022-07-06 PROCEDURE — 99214 OFFICE O/P EST MOD 30 MIN: CPT | Mod: 25

## 2022-07-06 RX ORDER — SODIUM FLUORIDE 1.1 G/100G
1.1 GEL ORAL
Qty: 168 | Refills: 0 | Status: ACTIVE | COMMUNITY
Start: 2021-09-03

## 2022-07-06 NOTE — REVIEW OF SYSTEMS
[Fatigue] : fatigue [Hearing Loss] : hearing loss [Negative] : Gastrointestinal [Dysuria] : dysuria [Nocturia] : nocturia [Frequency] : frequency

## 2022-07-06 NOTE — HISTORY OF PRESENT ILLNESS
[FreeTextEntry8] : Does no feel well x 4 days: low grade fever, body aches, night sweats, occasional chills

## 2022-07-06 NOTE — PLAN
[FreeTextEntry1] : Positive urine strip test\par Pending UA/UC\par CXR also orderred\par Initiated treatment with Levofloxacin and tamsulosin\par need urology f/u

## 2022-07-07 LAB
APPEARANCE: CLEAR
BACTERIA: NEGATIVE
BILIRUB UR QL STRIP: NEGATIVE
BILIRUBIN URINE: NEGATIVE
BLOOD URINE: NEGATIVE
CLARITY UR: CLEAR
COLLECTION METHOD: NORMAL
COLOR: YELLOW
GLUCOSE QUALITATIVE U: NORMAL
GLUCOSE UR-MCNC: NEGATIVE
HCG UR QL: 0.2 EU/DL
HGB UR QL STRIP.AUTO: NORMAL
HYALINE CASTS: 0 /LPF
KETONES UR-MCNC: NEGATIVE
KETONES URINE: NEGATIVE
LEUKOCYTE ESTERASE UR QL STRIP: NORMAL
LEUKOCYTE ESTERASE URINE: ABNORMAL
MICROSCOPIC-UA: NORMAL
NITRITE UR QL STRIP: POSITIVE
NITRITE URINE: POSITIVE
PH UR STRIP: 5
PH URINE: 6.5
PROT UR STRIP-MCNC: NORMAL
PROTEIN URINE: ABNORMAL
RAPID RVP RESULT: NOT DETECTED
RED BLOOD CELLS URINE: 7 /HPF
SARS-COV-2 RNA PNL RESP NAA+PROBE: NOT DETECTED
SP GR UR STRIP: 1.01
SPECIFIC GRAVITY URINE: 1.02
SQUAMOUS EPITHELIAL CELLS: 0 /HPF
UROBILINOGEN URINE: NORMAL
WHITE BLOOD CELLS URINE: 85 /HPF

## 2022-07-10 LAB — BACTERIA UR CULT: NORMAL

## 2022-07-12 ENCOUNTER — APPOINTMENT (OUTPATIENT)
Dept: FAMILY MEDICINE | Facility: CLINIC | Age: 87
End: 2022-07-12

## 2022-07-12 VITALS
SYSTOLIC BLOOD PRESSURE: 150 MMHG | WEIGHT: 190 LBS | DIASTOLIC BLOOD PRESSURE: 80 MMHG | BODY MASS INDEX: 26.6 KG/M2 | HEART RATE: 69 BPM | OXYGEN SATURATION: 97 % | HEIGHT: 71 IN | RESPIRATION RATE: 16 BRPM | TEMPERATURE: 98.2 F

## 2022-07-12 DIAGNOSIS — R10.13 EPIGASTRIC PAIN: ICD-10-CM

## 2022-07-12 DIAGNOSIS — N39.0 URINARY TRACT INFECTION, SITE NOT SPECIFIED: ICD-10-CM

## 2022-07-12 PROCEDURE — 81002 URINALYSIS NONAUTO W/O SCOPE: CPT

## 2022-07-12 PROCEDURE — 99214 OFFICE O/P EST MOD 30 MIN: CPT | Mod: 25

## 2022-07-12 NOTE — PLAN
[FreeTextEntry1] : No more fever chills\par Urine dispstick negative\par Abdominal exam, palpation unremarkable\par Passing gases and stool\par Will repeat UA/UC\par educated about GERD, diet and life style recommendations; abd pain may be due to Levofloxacin, will change the antibiotic\par Trial of PPI\par Call any time if not well

## 2022-07-12 NOTE — HISTORY OF PRESENT ILLNESS
[FreeTextEntry1] : f/u UTI\par epigastric pain [de-identified] : Responding well to Levofloxacin, no fever or chills anymore\par Urine culture not completed due to "more than 3 bacteria"\par Has severe epig pain which may be related to Levofloxacin

## 2022-07-13 LAB
APPEARANCE: CLEAR
BACTERIA: NEGATIVE
BILIRUBIN URINE: NEGATIVE
BLOOD URINE: NEGATIVE
COLOR: NORMAL
GLUCOSE QUALITATIVE U: ABNORMAL
HYALINE CASTS: 0 /LPF
KETONES URINE: NEGATIVE
LEUKOCYTE ESTERASE URINE: NEGATIVE
MICROSCOPIC-UA: NORMAL
NITRITE URINE: NEGATIVE
PH URINE: 8
PROTEIN URINE: NORMAL
RED BLOOD CELLS URINE: 1 /HPF
SPECIFIC GRAVITY URINE: 1.01
SQUAMOUS EPITHELIAL CELLS: 0 /HPF
UROBILINOGEN URINE: NORMAL
WHITE BLOOD CELLS URINE: 0 /HPF

## 2022-07-14 LAB — BACTERIA UR CULT: NORMAL

## 2022-07-25 ENCOUNTER — APPOINTMENT (OUTPATIENT)
Dept: FAMILY MEDICINE | Facility: CLINIC | Age: 87
End: 2022-07-25

## 2022-07-25 VITALS — TEMPERATURE: 98.1 F | SYSTOLIC BLOOD PRESSURE: 135 MMHG | DIASTOLIC BLOOD PRESSURE: 70 MMHG

## 2022-07-25 DIAGNOSIS — B37.42 CANDIDAL BALANITIS: ICD-10-CM

## 2022-07-25 PROCEDURE — 99213 OFFICE O/P EST LOW 20 MIN: CPT | Mod: 25

## 2022-07-25 NOTE — PHYSICAL EXAM
[Normal] : normal sclera/conjunctiva, pupils are equal, round and reactive to light and extraocular movements are intact [de-identified] : Rednes and swelling of the foreskin and penile gland , whitish deposits

## 2022-07-25 NOTE — HISTORY OF PRESENT ILLNESS
[FreeTextEntry8] : Completed the treatment for UTI.\par Was seen by his urologist , no additional \par Abdominal pain resolved after completed the Levofloxacin\par Now has burning and redness of penile area\par

## 2022-08-10 ENCOUNTER — APPOINTMENT (OUTPATIENT)
Dept: FAMILY MEDICINE | Facility: CLINIC | Age: 87
End: 2022-08-10

## 2022-08-16 ENCOUNTER — APPOINTMENT (OUTPATIENT)
Dept: FAMILY MEDICINE | Facility: CLINIC | Age: 87
End: 2022-08-16

## 2022-08-16 VITALS
DIASTOLIC BLOOD PRESSURE: 77 MMHG | SYSTOLIC BLOOD PRESSURE: 130 MMHG | HEIGHT: 71 IN | WEIGHT: 185 LBS | BODY MASS INDEX: 25.9 KG/M2

## 2022-08-16 DIAGNOSIS — R10.13 EPIGASTRIC PAIN: ICD-10-CM

## 2022-08-16 DIAGNOSIS — K21.9 GASTRO-ESOPHAGEAL REFLUX DISEASE W/OUT ESOPHAGITIS: ICD-10-CM

## 2022-08-16 PROCEDURE — 99213 OFFICE O/P EST LOW 20 MIN: CPT

## 2022-09-07 ENCOUNTER — APPOINTMENT (OUTPATIENT)
Dept: FAMILY MEDICINE | Facility: CLINIC | Age: 87
End: 2022-09-07

## 2022-09-07 VITALS
HEART RATE: 66 BPM | OXYGEN SATURATION: 97 % | WEIGHT: 185 LBS | SYSTOLIC BLOOD PRESSURE: 102 MMHG | HEIGHT: 71 IN | TEMPERATURE: 98.6 F | BODY MASS INDEX: 25.9 KG/M2 | RESPIRATION RATE: 16 BRPM | DIASTOLIC BLOOD PRESSURE: 70 MMHG

## 2022-09-07 PROCEDURE — 99213 OFFICE O/P EST LOW 20 MIN: CPT

## 2022-09-07 NOTE — PLAN
[FreeTextEntry1] : Recommended neuro eval\par Recommended cardiology re-evaluation\par 24 min spent with the patient

## 2022-09-07 NOTE — HISTORY OF PRESENT ILLNESS
[FreeTextEntry8] : The patient reports weakness in lower extremities and need to stop walking for few minutes after 30-40 meters.\par The peripheral arterial circulation checked by Doppler at [facility] was negative\par Also reports that BS some time is elevated, above 200\par Does notavoid sugars and starches

## 2022-10-13 ENCOUNTER — APPOINTMENT (OUTPATIENT)
Dept: FAMILY MEDICINE | Facility: CLINIC | Age: 87
End: 2022-10-13

## 2022-10-13 VITALS — DIASTOLIC BLOOD PRESSURE: 70 MMHG | SYSTOLIC BLOOD PRESSURE: 110 MMHG

## 2022-10-13 DIAGNOSIS — R42 DIZZINESS AND GIDDINESS: ICD-10-CM

## 2022-10-13 DIAGNOSIS — I73.9 PERIPHERAL VASCULAR DISEASE, UNSPECIFIED: ICD-10-CM

## 2022-10-13 DIAGNOSIS — Z23 ENCOUNTER FOR IMMUNIZATION: ICD-10-CM

## 2022-10-13 PROCEDURE — 99214 OFFICE O/P EST MOD 30 MIN: CPT | Mod: 25

## 2022-10-13 PROCEDURE — 90662 IIV NO PRSV INCREASED AG IM: CPT

## 2022-10-13 PROCEDURE — G0008: CPT

## 2022-10-13 NOTE — HISTORY OF PRESENT ILLNESS
[FreeTextEntry1] : declining health\par few episodes of syncope\par worsening intermittent claudication\par flu shot [de-identified] : pending neuro eval\par has problems driving\par need to have medical care close to the place where is living

## 2022-10-13 NOTE — REVIEW OF SYSTEMS
[Fainting] : fainting [Negative] : Respiratory [FreeTextEntry9] : lower extremities weakness, claudication

## 2022-11-02 ENCOUNTER — APPOINTMENT (OUTPATIENT)
Dept: CARDIOLOGY | Facility: CLINIC | Age: 87
End: 2022-11-02

## 2022-11-02 PROCEDURE — 93306 TTE W/DOPPLER COMPLETE: CPT

## 2022-11-07 ENCOUNTER — NON-APPOINTMENT (OUTPATIENT)
Age: 87
End: 2022-11-07

## 2022-11-09 ENCOUNTER — NON-APPOINTMENT (OUTPATIENT)
Age: 87
End: 2022-11-09

## 2022-11-10 ENCOUNTER — NON-APPOINTMENT (OUTPATIENT)
Age: 87
End: 2022-11-10

## 2022-11-14 ENCOUNTER — NON-APPOINTMENT (OUTPATIENT)
Age: 87
End: 2022-11-14

## 2022-11-15 ENCOUNTER — APPOINTMENT (OUTPATIENT)
Dept: CARDIOLOGY | Facility: CLINIC | Age: 87
End: 2022-11-15

## 2022-11-15 ENCOUNTER — NON-APPOINTMENT (OUTPATIENT)
Age: 87
End: 2022-11-15

## 2022-11-15 VITALS — DIASTOLIC BLOOD PRESSURE: 64 MMHG | SYSTOLIC BLOOD PRESSURE: 128 MMHG

## 2022-11-15 VITALS
SYSTOLIC BLOOD PRESSURE: 122 MMHG | OXYGEN SATURATION: 98 % | WEIGHT: 179 LBS | RESPIRATION RATE: 16 BRPM | DIASTOLIC BLOOD PRESSURE: 58 MMHG | HEIGHT: 71 IN | HEART RATE: 74 BPM | BODY MASS INDEX: 25.06 KG/M2 | TEMPERATURE: 97.7 F

## 2022-11-15 VITALS — DIASTOLIC BLOOD PRESSURE: 50 MMHG | SYSTOLIC BLOOD PRESSURE: 112 MMHG

## 2022-11-15 PROCEDURE — 93000 ELECTROCARDIOGRAM COMPLETE: CPT | Mod: 59

## 2022-11-15 PROCEDURE — 99215 OFFICE O/P EST HI 40 MIN: CPT

## 2022-11-15 PROCEDURE — 93246 EXT ECG>7D<15D RECORDING: CPT

## 2022-11-15 RX ORDER — PANTOPRAZOLE 40 MG/1
40 TABLET, DELAYED RELEASE ORAL
Qty: 30 | Refills: 0 | Status: DISCONTINUED | COMMUNITY
Start: 2022-07-12 | End: 2022-11-15

## 2022-11-15 RX ORDER — DULOXETINE HYDROCHLORIDE 30 MG/1
30 CAPSULE, DELAYED RELEASE PELLETS ORAL
Qty: 90 | Refills: 1 | Status: DISCONTINUED | COMMUNITY
Start: 2022-06-15 | End: 2022-11-15

## 2022-11-15 RX ORDER — TAMSULOSIN HYDROCHLORIDE 0.4 MG/1
0.4 CAPSULE ORAL
Qty: 30 | Refills: 5 | Status: DISCONTINUED | COMMUNITY
Start: 2022-07-06 | End: 2022-11-15

## 2022-11-15 RX ORDER — METRONIDAZOLE 7.5 MG/G
0.75 GEL TOPICAL
Qty: 45 | Refills: 0 | Status: DISCONTINUED | COMMUNITY
Start: 2022-10-24

## 2022-11-15 RX ORDER — LEVOFLOXACIN 500 MG/1
500 TABLET, FILM COATED ORAL
Qty: 15 | Refills: 0 | Status: DISCONTINUED | COMMUNITY
Start: 2022-07-06 | End: 2022-11-15

## 2022-11-15 RX ORDER — FLUCONAZOLE 200 MG/1
200 TABLET ORAL DAILY
Qty: 5 | Refills: 0 | Status: DISCONTINUED | COMMUNITY
Start: 2022-07-25 | End: 2022-11-15

## 2022-11-15 RX ORDER — NYSTATIN 100000 [USP'U]/G
100000 CREAM TOPICAL 3 TIMES DAILY
Qty: 1 | Refills: 3 | Status: DISCONTINUED | COMMUNITY
Start: 2022-07-28 | End: 2022-11-15

## 2022-11-15 NOTE — HISTORY OF PRESENT ILLNESS
[FreeTextEntry1] : This 87 year-old male patient presents for cardiovascular evaluation.\par \par His problem list is as noted above.\par \par Since last examination 1 year ago the patient has had some significant medical interactions.\par \par Of note is that he has transferred his primary care to Dr. Justice Andrade, closer to his home.\par \par The patient has been hospitalized twice for urinary tract infection at [place].  He is completing antibiotic therapy currently.\par \par He also reports he has had a number of episodes of syncopal or near syncopal episodes.  The etiology is not clear and this appears to be a new development.  Dr. Andrade note reports that he was orthostatic with a drop in his blood pressure from 144/82 supine to 104/60 standing.\par \par Most recently the patient had a near syncopal episode in October after walking up a large flight of stairs because the escalator was broken.  He did not seek medical attention at that time.  An ambulance was not called.\par \par The patient reports that he has generalized weakness but he can perform some usual household activities.\par \par There are no acute symptoms of chest discomfort, shortness of breath, palpitation.\par \par

## 2022-11-15 NOTE — CARDIOLOGY SUMMARY
[de-identified] : EKG 11/8/2021.  Sinus rhythm.  APC.  VPC versus aberrant conduction.  RBBB.  LAFB.  First-degree AV block.  No acute features. [de-identified] : Holter 01/2012 sinus rhythm. Very frequent APCs. Frequent VPCs. Occasional but  inconsistent symptom correlation.\par \par  [de-identified] : Treadmill Sestamibi 01/2012. Normal. 9 minutes. Cedric stage III. 54%.\par  [de-identified] : Echo 11/2/2022.  Normal LV function.  EF 70%.  Mild diastolic dysfunction.  Mild aortic sclerosis.  Peak/mean gradient 13/8.  Mildly dilated aortic root, 3.8 cm and proximal ascending aorta, 3.8 cm.  Deviated atrial septum without shunt.  No change from 2019.\par \par Echocardiogram 12/5/2019. A. F. Normal LV function. EF 70%. Mild diastolic dysfunction.  Slightly dilated LA. Normal PA. Mild MR. Aortic sclerosis without gradient. No change  from 2016.\par Echo 1/14/2016. Normal LV function. EF 70%. Trace MR. Trivial aortic sclerosis. Gradient  10/5. Trace TR. PA 23-28.\par

## 2022-11-15 NOTE — REASON FOR VISIT
[FreeTextEntry3] : Dr. Justice Andrade [FreeTextEntry1] : Mr. ALEXIA GARDNER has the following problem list:\par \par Coronary atherosclerosis on the basis of imaging.\par Cardiac arrhythmias, \par Abnormal EKG, RBBB, LAFB.\par Minor valve abnormalities,\par Dyslipidemia\par Diabetes.\par Chronic kidney disease.\par Syncope.\par \par He has additional medical problems as noted.\par \par His primary care physician is now Dr. Justice Andrade\par \par

## 2022-11-15 NOTE — DISCUSSION/SUMMARY
[FreeTextEntry1] : Brief recommendations and follow-up: (see above for details)\par \par There are number of items to review on today's visit.\par As noted, he has been hospitalized for urinary tract infections and he has urology follow-up.\par He has had syncope or near syncope and a work-up is warranted.\par A 2-week event monitor is placed today.\par He also has an upcoming neurology appointment.\par He has a new primary care physician as noted.\par I am taking the liberty of starting pravastatin 20 mg daily.  He was previously intolerant of atorvastatin.\par The patient is going to be seeing Dr. Andrade in the future and follow-up laboratories can be drawn at that time.\par Echo findings reviewed and overall satisfactory.\par Medications reviewed, reconciled.\par Further recommendations will follow after his event monitor.  He will also take his blood pressure at home.\par I will asked the patient to return to see me in approximately 6 weeks after completion of his testing.\par Today's visit 45 minutes.\par The patient will bring a copy of this report to his neurologist and urologist.

## 2022-11-15 NOTE — ASSESSMENT
[FreeTextEntry1] : EKG 11/15/2022.  Sinus rhythm.  First-degree AV block.  Right bundle branch block.  Left anterior fascicular block.  No acute features.\par EKG 11/8/2021.  Sinus rhythm.  APC.  VPC versus aberrant conduction.  RBBB.  LAFB.  First-degree AV block.  No acute features.\par EKG 11/4/2020.  Sinus bradycardia.  Heart rate 58.  First-degree AV block.  OK interval 0.23.  RBBB.  LAFB.\par EKG 11/14/2019.  Sinus bradycardia.  Heart rate 56.  Right bundle branch block.  Left anterior fascicular block.  No acute changes.

## 2022-11-15 NOTE — REVIEW OF SYSTEMS
[FreeTextEntry3] : He reports early cataracts. [FreeTextEntry4] : Mild hearing loss. [FreeTextEntry5] : See HPI. [FreeTextEntry7] : Longstanding treated constipation.  This is improved with treatment. [FreeTextEntry8] : Nocturia x1 or up to 3..  Recurrent urinary tract infections.  He is under urologic care.  Not active. [FreeTextEntry9] : Moderate knee pain. [de-identified] : Thrombocytopenia.

## 2022-12-11 PROCEDURE — 93248 EXT ECG>7D<15D REV&INTERPJ: CPT

## 2023-01-30 ENCOUNTER — NON-APPOINTMENT (OUTPATIENT)
Age: 88
End: 2023-01-30

## 2023-01-31 ENCOUNTER — NON-APPOINTMENT (OUTPATIENT)
Age: 88
End: 2023-01-31

## 2023-01-31 ENCOUNTER — APPOINTMENT (OUTPATIENT)
Dept: CARDIOLOGY | Facility: CLINIC | Age: 88
End: 2023-01-31
Payer: MEDICARE

## 2023-01-31 VITALS
HEART RATE: 65 BPM | OXYGEN SATURATION: 96 % | WEIGHT: 174.06 LBS | BODY MASS INDEX: 24.37 KG/M2 | TEMPERATURE: 97.6 F | SYSTOLIC BLOOD PRESSURE: 124 MMHG | HEIGHT: 71 IN | DIASTOLIC BLOOD PRESSURE: 64 MMHG

## 2023-01-31 VITALS — DIASTOLIC BLOOD PRESSURE: 70 MMHG | SYSTOLIC BLOOD PRESSURE: 116 MMHG

## 2023-01-31 VITALS — DIASTOLIC BLOOD PRESSURE: 70 MMHG | SYSTOLIC BLOOD PRESSURE: 118 MMHG

## 2023-01-31 DIAGNOSIS — G91.2 (IDIOPATHIC) NORMAL PRESSURE HYDROCEPHALUS: ICD-10-CM

## 2023-01-31 DIAGNOSIS — R94.31 ABNORMAL ELECTROCARDIOGRAM [ECG] [EKG]: ICD-10-CM

## 2023-01-31 PROCEDURE — 99215 OFFICE O/P EST HI 40 MIN: CPT

## 2023-01-31 PROCEDURE — 93000 ELECTROCARDIOGRAM COMPLETE: CPT

## 2023-01-31 RX ORDER — SULFAMETHOXAZOLE AND TRIMETHOPRIM 800; 160 MG/1; MG/1
800-160 TABLET ORAL TWICE DAILY
Qty: 20 | Refills: 1 | Status: DISCONTINUED | COMMUNITY
Start: 2022-07-12 | End: 2023-01-31

## 2023-01-31 NOTE — CARDIOLOGY SUMMARY
[de-identified] : EKG 11/8/2021.  Sinus rhythm.  APC.  VPC versus aberrant conduction.  RBBB.  LAFB.  First-degree AV block.  No acute features. [de-identified] : Event monitor completed 11/29/2022.  Sinus rhythm with underlying bundle branch block and first-degree AV block.  Occasional APC.  2 short (less than 10 beat) atrial runs (asymptomatic).  Rare VPC.  2 ventricular triplets.  No pauses or high-grade AV block.  No symptom correlation.\par \par Holter 01/2012 sinus rhythm. Very frequent APCs. Frequent VPCs. Occasional but  inconsistent symptom correlation.\par \par  [de-identified] : Treadmill Sestamibi 01/2012. Normal. 9 minutes. Cedric stage III. 54%.\par  [de-identified] : Echo 11/2/2022.  Normal LV function.  EF 70%.  Mild diastolic dysfunction.  Mild aortic sclerosis.  Peak/mean gradient 13/8.  Mildly dilated aortic root, 3.8 cm and proximal ascending aorta, 3.8 cm.  Deviated atrial septum without shunt.  No change from 2019.\par \par Echocardiogram 12/5/2019. A. F. Normal LV function. EF 70%. Mild diastolic dysfunction.  Slightly dilated LA. Normal PA. Mild MR. Aortic sclerosis without gradient. No change  from 2016.\par Echo 1/14/2016. Normal LV function. EF 70%. Trace MR. Trivial aortic sclerosis. Gradient  10/5. Trace TR. PA 23-28.\par

## 2023-01-31 NOTE — ASSESSMENT
[FreeTextEntry1] : EKG 1/31/2023.  Sinus rhythm.  First-degree AV block.  Right bundle branch block.  Left anterior fascicular block.  No acute changes.\par EKG 11/15/2022.  Sinus rhythm.  First-degree AV block.  Right bundle branch block.  Left anterior fascicular block.  No acute features.\par EKG 11/8/2021.  Sinus rhythm.  APC.  VPC versus aberrant conduction.  RBBB.  LAFB.  First-degree AV block.  No acute features.\par EKG 11/4/2020.  Sinus bradycardia.  Heart rate 58.  First-degree AV block.  FL interval 0.23.  RBBB.  LAFB.\par EKG 11/14/2019.  Sinus bradycardia.  Heart rate 56.  Right bundle branch block.  Left anterior fascicular block.  No acute changes.

## 2023-01-31 NOTE — REVIEW OF SYSTEMS
[FreeTextEntry3] : He reports early cataracts. [FreeTextEntry4] : Mild hearing loss. [FreeTextEntry7] : Longstanding treated constipation.  This is improved with treatment. [FreeTextEntry5] : See HPI. [FreeTextEntry8] : Nocturia x1 or up to 3..  Recurrent urinary tract infections.  He is under urologic care.  Not sexually active. [FreeTextEntry9] : Moderate knee pain. [de-identified] : He notes imbalance.  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  Under the care of Dr. Mena Kamara. [de-identified] : Thrombocytopenia.

## 2023-01-31 NOTE — DISCUSSION/SUMMARY
[FreeTextEntry1] : Brief recommendations and follow-up: (see above for details)\par \par As noted the patient has a considerable cardiovascular medical problem list.\par Currently the patient is dealing with an evaluation for normal pressure hydrocephalus.\par I have asked the patient to call Dr. Mena Kamara's office and kindly forward a copy of her note.  I will forward to her a copy of my note.\par I advised the patient to have comprehensive blood work with his primary care physician and kindly forward a copy.\par He remains with orthostatic hypotension which at this point appears reasonably well compensated.  No further episodes of syncope.  I am suspicious of some additional neurologic related autonomic dysfunction.\par The patient has conduction disease but this does not appear to be the cause of his syncope.  This will be followed.\par Echocardiogram was satisfactory.\par Next routine cardiology visit 6 months but we can adjust this as necessary depending on his clinical course and additional diagnoses.

## 2023-01-31 NOTE — HISTORY OF PRESENT ILLNESS
[FreeTextEntry1] : This 87 year-old male patient presents for cardiovascular evaluation.\par \par His problem list is as noted above.\par \par Since his last examination approximately 4 months ago the patient has had some significant medical evaluations.\par \par As noted previously, the patient had an episode of syncope as well as periodic significant orthostatic hypotension.  Neurology evaluation was recommended.  He saw Dr. Mena Kamara.  The report is not a my receipt however the patient states he had a number of diagnostic studies that demonstrated normal pressure hydrocephalus.  He is going to be seeking additional opinions in [place].\par \par The patient has been undergoing physical therapy.  He has been noted with differential blood pressure as well as continuing significant orthostatic hypotension.  No further episodes of syncope however.\par \par The patient tries to stay active.  There are no acute symptoms of chest discomfort, shortness of breath, palpitation.  He does note imbalance.\par \par He also completed cardiac diagnostic studies including his an echocardiogram that was satisfactory.  His event monitor showed no high-grade arrhythmias or heart block.\par \par \par

## 2023-02-15 ENCOUNTER — NON-APPOINTMENT (OUTPATIENT)
Age: 88
End: 2023-02-15

## 2023-07-14 ENCOUNTER — NON-APPOINTMENT (OUTPATIENT)
Age: 88
End: 2023-07-14

## 2023-07-17 ENCOUNTER — NON-APPOINTMENT (OUTPATIENT)
Age: 88
End: 2023-07-17

## 2023-07-17 ENCOUNTER — APPOINTMENT (OUTPATIENT)
Dept: CARDIOLOGY | Facility: CLINIC | Age: 88
End: 2023-07-17
Payer: MEDICARE

## 2023-07-17 VITALS
OXYGEN SATURATION: 98 % | RESPIRATION RATE: 18 BRPM | WEIGHT: 187.31 LBS | HEIGHT: 71 IN | DIASTOLIC BLOOD PRESSURE: 76 MMHG | HEART RATE: 62 BPM | TEMPERATURE: 97.4 F | SYSTOLIC BLOOD PRESSURE: 128 MMHG | BODY MASS INDEX: 26.22 KG/M2

## 2023-07-17 VITALS — DIASTOLIC BLOOD PRESSURE: 74 MMHG | SYSTOLIC BLOOD PRESSURE: 144 MMHG

## 2023-07-17 VITALS — DIASTOLIC BLOOD PRESSURE: 70 MMHG | SYSTOLIC BLOOD PRESSURE: 136 MMHG

## 2023-07-17 DIAGNOSIS — I25.10 ATHEROSCLEROTIC HEART DISEASE OF NATIVE CORONARY ARTERY W/OUT ANGINA PECTORIS: ICD-10-CM

## 2023-07-17 DIAGNOSIS — Z92.89 PERSONAL HISTORY OF OTHER MEDICAL TREATMENT: ICD-10-CM

## 2023-07-17 DIAGNOSIS — R55 SYNCOPE AND COLLAPSE: ICD-10-CM

## 2023-07-17 DIAGNOSIS — E11.9 TYPE 2 DIABETES MELLITUS W/OUT COMPLICATIONS: ICD-10-CM

## 2023-07-17 DIAGNOSIS — I49.9 CARDIAC ARRHYTHMIA, UNSPECIFIED: ICD-10-CM

## 2023-07-17 DIAGNOSIS — N18.9 CHRONIC KIDNEY DISEASE, UNSPECIFIED: ICD-10-CM

## 2023-07-17 DIAGNOSIS — E78.5 HYPERLIPIDEMIA, UNSPECIFIED: ICD-10-CM

## 2023-07-17 DIAGNOSIS — I73.9 PERIPHERAL VASCULAR DISEASE, UNSPECIFIED: ICD-10-CM

## 2023-07-17 DIAGNOSIS — D69.6 THROMBOCYTOPENIA, UNSPECIFIED: ICD-10-CM

## 2023-07-17 DIAGNOSIS — I95.1 ORTHOSTATIC HYPOTENSION: ICD-10-CM

## 2023-07-17 DIAGNOSIS — Z01.89 ENCOUNTER FOR OTHER SPECIFIED SPECIAL EXAMINATIONS: ICD-10-CM

## 2023-07-17 DIAGNOSIS — I38 ENDOCARDITIS, VALVE UNSPECIFIED: ICD-10-CM

## 2023-07-17 PROCEDURE — 93000 ELECTROCARDIOGRAM COMPLETE: CPT

## 2023-07-17 PROCEDURE — 99214 OFFICE O/P EST MOD 30 MIN: CPT

## 2023-07-17 RX ORDER — CHLORHEXIDINE GLUCONATE 4 %
250 LIQUID (ML) TOPICAL
Refills: 0 | Status: ACTIVE | COMMUNITY
Start: 2023-07-17

## 2023-07-17 RX ORDER — UBIDECARENONE/VIT E ACET 100MG-5
100 CAPSULE ORAL
Refills: 0 | Status: ACTIVE | COMMUNITY
Start: 2023-07-17

## 2023-07-17 RX ORDER — LORATADINE 5 MG
17 TABLET,CHEWABLE ORAL DAILY
Refills: 0 | Status: ACTIVE | COMMUNITY

## 2023-07-17 RX ORDER — PRAVASTATIN SODIUM 20 MG/1
20 TABLET ORAL
Qty: 90 | Refills: 3 | Status: ACTIVE | COMMUNITY
Start: 2022-11-15 | End: 1900-01-01

## 2023-07-17 RX ORDER — UBIDECARENONE 100 MG
100 CAPSULE ORAL
Refills: 0 | Status: DISCONTINUED | COMMUNITY
End: 2023-07-17

## 2023-07-17 RX ORDER — VIT C/VIT E/LUTEIN/MIN/OMEGA-3 100-15-2
CAPSULE ORAL
Refills: 0 | Status: ACTIVE | COMMUNITY
Start: 2023-07-17

## 2023-07-17 RX ORDER — ZINC SULFATE TAB 220 MG (50 MG ZINC EQUIVALENT) 220 (50 ZN) MG
220 (50 ZN) TAB ORAL
Refills: 0 | Status: ACTIVE | COMMUNITY
Start: 2023-07-17

## 2023-07-17 NOTE — HISTORY OF PRESENT ILLNESS
[FreeTextEntry1] : This 88 year-old male patient presents for cardiovascular evaluation.\par \par His problem list is as noted above.\par \par Since last examination the patient has had some medical evaluations.\par \par He has been followed by neurology and neurosurgical specialist for concerns over potential normal pressure hydrocephalus and neurocognitive dysfunction.  A spinal drainage trial was suggested but at this point has not been performed.\par \par The patient is active and playing some tennis.  There have been no further episodes of syncope.\par \par Laboratories have been performed that were kindly provided.\par \par There are no acute symptoms of chest discomfort, shortness of breath, palpitation, lightheadedness, fainting.\par \par \par \par \par \par

## 2023-07-17 NOTE — REVIEW OF SYSTEMS
[FreeTextEntry3] : He reports moderate cataracts.  Cancel surgery. [FreeTextEntry4] : Mild hearing loss. [FreeTextEntry5] : See HPI. [FreeTextEntry7] : Longstanding treated constipation.  This is improved with treatment. [FreeTextEntry8] : Nocturia x1 or up to 3..  Recurrent urinary tract infections.  He is under urologic care.  Not sexually active. [FreeTextEntry9] : Moderate knee pain. [de-identified] : He notes imbalance.  He reports a evaluation for normal pressure hydrocephalus.  Under the care of Dr. Mena Kamara. [de-identified] : Thrombocytopenia.

## 2023-07-17 NOTE — DISCUSSION/SUMMARY
[FreeTextEntry1] : Brief recommendations and follow-up: (see above for details)\par \par The patient's overall cardiovascular status is stable.\par I was pleased to learn that there have been no further syncopal or major orthostatic events.\par He is still be evaluated by the neurology and neurosurgical team for potential normal pressure hydrocephalus but the diagnosis is by no means confirmed.\par Conduction findings satisfactory.  No major arrhythmias.\par No significant orthostasis on today's exam.\par Lipid satisfactory.  Continue statin.\par Valve status satisfactory and I anticipate an echo after next year's visit.\par Next routine cardiology visit 1 year.

## 2023-07-17 NOTE — ASSESSMENT
[FreeTextEntry1] : EKG 7/17/2023.  Sinus rhythm.  First-degree AV block.  Right bundle branch block.  Left anterior fascicular block.  No acute changes.\par EKG 1/31/2023.  Sinus rhythm.  First-degree AV block.  Right bundle branch block.  Left anterior fascicular block.  No acute changes.\par EKG 11/15/2022.  Sinus rhythm.  First-degree AV block.  Right bundle branch block.  Left anterior fascicular block.  No acute features.\par EKG 11/8/2021.  Sinus rhythm.  APC.  VPC versus aberrant conduction.  RBBB.  LAFB.  First-degree AV block.  No acute features.\par EKG 11/4/2020.  Sinus bradycardia.  Heart rate 58.  First-degree AV block.  RI interval 0.23.  RBBB.  LAFB.\par

## 2023-07-17 NOTE — CARDIOLOGY SUMMARY
[de-identified] : EKG 11/8/2021.  Sinus rhythm.  APC.  VPC versus aberrant conduction.  RBBB.  LAFB.  First-degree AV block.  No acute features. [de-identified] : Event monitor completed 11/29/2022.  Sinus rhythm with underlying bundle branch block and first-degree AV block.  Occasional APC.  2 short (less than 10 beat) atrial runs (asymptomatic).  Rare VPC.  2 ventricular triplets.  No pauses or high-grade AV block.  No symptom correlation.\par \par Holter 01/2012 sinus rhythm. Very frequent APCs. Frequent VPCs. Occasional but  inconsistent symptom correlation.\par \par  [de-identified] : Treadmill Sestamibi 01/2012. Normal. 9 minutes. Cedric stage III. 54%.\par  [de-identified] : Echo 11/2/2022.  Normal LV function.  EF 70%.  Mild diastolic dysfunction.  Mild aortic sclerosis.  Peak/mean gradient 13/8.  Mildly dilated aortic root, 3.8 cm and proximal ascending aorta, 3.8 cm.  Deviated atrial septum without shunt.  No change from 2019.\par \par Echocardiogram 12/5/2019. A. F. Normal LV function. EF 70%. Mild diastolic dysfunction.  Slightly dilated LA. Normal PA. Mild MR. Aortic sclerosis without gradient. No change  from 2016.\par Echo 1/14/2016. Normal LV function. EF 70%. Trace MR. Trivial aortic sclerosis. Gradient  10/5. Trace TR. PA 23-28.\par  [de-identified] : Brain MRI 12/12/2022. Suspicious for normal pressure hydrocephalus.

## 2024-06-22 NOTE — REASON FOR VISIT
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 Cleared with 1L IVF   [Annual Wellness Visit] : an annual wellness visit

## 2024-07-22 ENCOUNTER — APPOINTMENT (OUTPATIENT)
Dept: CARDIOLOGY | Facility: CLINIC | Age: 89
End: 2024-07-22